# Patient Record
Sex: FEMALE | Race: BLACK OR AFRICAN AMERICAN | NOT HISPANIC OR LATINO
[De-identification: names, ages, dates, MRNs, and addresses within clinical notes are randomized per-mention and may not be internally consistent; named-entity substitution may affect disease eponyms.]

---

## 2018-08-09 PROBLEM — Z00.00 ENCOUNTER FOR PREVENTIVE HEALTH EXAMINATION: Status: ACTIVE | Noted: 2018-08-09

## 2018-08-14 ENCOUNTER — ASOB RESULT (OUTPATIENT)
Age: 33
End: 2018-08-14

## 2018-08-14 ENCOUNTER — APPOINTMENT (OUTPATIENT)
Dept: MATERNAL FETAL MEDICINE | Facility: CLINIC | Age: 33
End: 2018-08-14

## 2018-08-14 ENCOUNTER — APPOINTMENT (OUTPATIENT)
Dept: ANTEPARTUM | Facility: CLINIC | Age: 33
End: 2018-08-14
Payer: COMMERCIAL

## 2018-08-14 PROCEDURE — 76813 OB US NUCHAL MEAS 1 GEST: CPT

## 2018-08-14 PROCEDURE — 36416 COLLJ CAPILLARY BLOOD SPEC: CPT

## 2018-08-15 ENCOUNTER — TRANSCRIPTION ENCOUNTER (OUTPATIENT)
Age: 33
End: 2018-08-15

## 2018-08-20 LAB
1ST TRIMESTER DATA: NORMAL
ADDENDUM DOC: NORMAL
AFP PNL SERPL: NORMAL
AFP SERPL-ACNC: NORMAL
CLINICAL BIOCHEMIST REVIEW: ABNORMAL
FREE BETA HCG 1ST TRIMESTER: NORMAL
Lab: NORMAL
NOTES NTD: NORMAL
NT: NORMAL
PAPP-A SERPL-ACNC: NORMAL
TRISOMY 18/3: NORMAL

## 2018-08-24 ENCOUNTER — APPOINTMENT (OUTPATIENT)
Dept: MATERNAL FETAL MEDICINE | Facility: CLINIC | Age: 33
End: 2018-08-24
Payer: COMMERCIAL

## 2018-08-24 ENCOUNTER — ASOB RESULT (OUTPATIENT)
Age: 33
End: 2018-08-24

## 2018-08-24 PROCEDURE — 99241 OFFICE CONSULTATION NEW/ESTAB PATIENT 15 MIN: CPT

## 2018-09-10 ENCOUNTER — APPOINTMENT (OUTPATIENT)
Dept: ANTEPARTUM | Facility: CLINIC | Age: 33
End: 2018-09-10
Payer: COMMERCIAL

## 2018-09-10 ENCOUNTER — APPOINTMENT (OUTPATIENT)
Dept: MATERNAL FETAL MEDICINE | Facility: CLINIC | Age: 33
End: 2018-09-10

## 2018-09-10 ENCOUNTER — ASOB RESULT (OUTPATIENT)
Age: 33
End: 2018-09-10

## 2018-09-10 PROCEDURE — 76805 OB US >/= 14 WKS SNGL FETUS: CPT

## 2018-09-14 LAB
1ST TRIMESTER DATA: NORMAL
2ND TRIMESTER DATA: NORMAL
ADDENDUM DOC: NORMAL
AFP PNL SERPL: NORMAL
AFP SERPL-ACNC: NORMAL
AFP SERPL-ACNC: NORMAL
B-HCG FREE SERPL-MCNC: NORMAL
CLINICAL BIOCHEMIST REVIEW: ABNORMAL
CLINICAL BIOCHEMIST REVIEW: NORMAL
CLINICAL BIOCHEMIST REVIEW: NORMAL
FREE BETA HCG 1ST TRIMESTER: NORMAL
INHIBIN A SERPL-MCNC: NORMAL
Lab: NORMAL
NOTES NTD: NORMAL
NT: NORMAL
PAPP-A SERPL-ACNC: NORMAL
U ESTRIOL SERPL-SCNC: NORMAL

## 2018-10-08 ENCOUNTER — APPOINTMENT (OUTPATIENT)
Dept: ANTEPARTUM | Facility: CLINIC | Age: 33
End: 2018-10-08
Payer: COMMERCIAL

## 2018-10-08 ENCOUNTER — ASOB RESULT (OUTPATIENT)
Age: 33
End: 2018-10-08

## 2018-10-08 PROCEDURE — 76811 OB US DETAILED SNGL FETUS: CPT

## 2018-10-08 PROCEDURE — 76817 TRANSVAGINAL US OBSTETRIC: CPT

## 2018-12-05 ENCOUNTER — ASOB RESULT (OUTPATIENT)
Age: 33
End: 2018-12-05

## 2018-12-05 ENCOUNTER — APPOINTMENT (OUTPATIENT)
Dept: ANTEPARTUM | Facility: CLINIC | Age: 33
End: 2018-12-05
Payer: COMMERCIAL

## 2018-12-05 PROCEDURE — 76819 FETAL BIOPHYS PROFIL W/O NST: CPT

## 2018-12-05 PROCEDURE — 76820 UMBILICAL ARTERY ECHO: CPT

## 2018-12-05 PROCEDURE — 76816 OB US FOLLOW-UP PER FETUS: CPT

## 2019-01-24 ENCOUNTER — APPOINTMENT (OUTPATIENT)
Age: 34
End: 2019-01-24
Payer: COMMERCIAL

## 2019-01-24 ENCOUNTER — ASOB RESULT (OUTPATIENT)
Age: 34
End: 2019-01-24

## 2019-01-24 PROCEDURE — 76816 OB US FOLLOW-UP PER FETUS: CPT

## 2019-01-24 PROCEDURE — 93976 VASCULAR STUDY: CPT

## 2019-01-24 PROCEDURE — 76820 UMBILICAL ARTERY ECHO: CPT

## 2022-08-24 ENCOUNTER — APPOINTMENT (OUTPATIENT)
Dept: ORTHOPEDIC SURGERY | Facility: CLINIC | Age: 37
End: 2022-08-24

## 2022-08-24 VITALS — BODY MASS INDEX: 29.82 KG/M2 | WEIGHT: 190 LBS | HEIGHT: 67 IN

## 2022-08-24 DIAGNOSIS — M75.41 IMPINGEMENT SYNDROME OF RIGHT SHOULDER: ICD-10-CM

## 2022-08-24 DIAGNOSIS — Z78.9 OTHER SPECIFIED HEALTH STATUS: ICD-10-CM

## 2022-08-24 PROCEDURE — 73030 X-RAY EXAM OF SHOULDER: CPT | Mod: RT

## 2022-08-24 PROCEDURE — 73010 X-RAY EXAM OF SHOULDER BLADE: CPT | Mod: RT

## 2022-08-24 PROCEDURE — 99213 OFFICE O/P EST LOW 20 MIN: CPT

## 2022-08-24 RX ORDER — DICLOFENAC SODIUM 75 MG/1
75 TABLET, DELAYED RELEASE ORAL TWICE DAILY
Qty: 60 | Refills: 3 | Status: ACTIVE | COMMUNITY
Start: 2022-08-24 | End: 1900-01-01

## 2022-08-24 NOTE — PHYSICAL EXAM
[Right] : right shoulder [5 ___] : forward flexion 5[unfilled]/5 [5___] : external rotation 5[unfilled]/5 [] : positive Misael [FreeTextEntry9] : FE: R 130, L 150\par ER: R 50, L 50

## 2022-08-24 NOTE — ASSESSMENT
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

## 2022-08-24 NOTE — HISTORY OF PRESENT ILLNESS
[Gradual] : gradual [8] : 8 [5] : 5 [Localized] : localized [Shooting] : shooting [Tightness] : tightness [Constant] : constant [Household chores] : household chores [Nothing helps with pain getting better] : Nothing helps with pain getting better [Full time] : Work status: full time [de-identified] : 38 y/o ambidextrous female here today for the R shoulder.  She noticed pain in the shoulder for 2 months, no injury.  She feels pain more anterior and deep.  Worse with movement and overhead.  No treatment.   [] : no [FreeTextEntry1] : Right Shoulder  [de-identified] : Motion